# Patient Record
Sex: MALE | Race: WHITE | NOT HISPANIC OR LATINO | URBAN - METROPOLITAN AREA
[De-identification: names, ages, dates, MRNs, and addresses within clinical notes are randomized per-mention and may not be internally consistent; named-entity substitution may affect disease eponyms.]

---

## 2018-03-26 ENCOUNTER — EMERGENCY (EMERGENCY)
Facility: HOSPITAL | Age: 53
LOS: 1 days | Discharge: HOME | End: 2018-03-26
Attending: EMERGENCY MEDICINE | Admitting: EMERGENCY MEDICINE
Payer: COMMERCIAL

## 2018-03-26 VITALS
OXYGEN SATURATION: 99 % | TEMPERATURE: 99 F | RESPIRATION RATE: 16 BRPM | HEART RATE: 82 BPM | DIASTOLIC BLOOD PRESSURE: 76 MMHG | SYSTOLIC BLOOD PRESSURE: 125 MMHG

## 2018-03-26 DIAGNOSIS — Z79.891 LONG TERM (CURRENT) USE OF OPIATE ANALGESIC: ICD-10-CM

## 2018-03-26 DIAGNOSIS — Z79.2 LONG TERM (CURRENT) USE OF ANTIBIOTICS: ICD-10-CM

## 2018-03-26 DIAGNOSIS — M79.89 OTHER SPECIFIED SOFT TISSUE DISORDERS: ICD-10-CM

## 2018-03-26 DIAGNOSIS — M65.142 OTHER INFECTIVE (TENO)SYNOVITIS, LEFT HAND: ICD-10-CM

## 2018-03-26 PROCEDURE — 73140 X-RAY EXAM OF FINGER(S): CPT | Mod: 26,LT

## 2018-03-26 PROCEDURE — 99218: CPT | Mod: 25

## 2018-03-26 RX ORDER — OXYCODONE AND ACETAMINOPHEN 5; 325 MG/1; MG/1
1 TABLET ORAL ONCE
Qty: 0 | Refills: 0 | Status: DISCONTINUED | OUTPATIENT
Start: 2018-03-26 | End: 2018-03-26

## 2018-03-26 RX ORDER — HYDROMORPHONE HYDROCHLORIDE 2 MG/ML
0.5 INJECTION INTRAMUSCULAR; INTRAVENOUS; SUBCUTANEOUS ONCE
Qty: 0 | Refills: 0 | Status: DISCONTINUED | OUTPATIENT
Start: 2018-03-26 | End: 2018-03-26

## 2018-03-26 RX ORDER — HYDROMORPHONE HYDROCHLORIDE 2 MG/ML
1 INJECTION INTRAMUSCULAR; INTRAVENOUS; SUBCUTANEOUS ONCE
Qty: 0 | Refills: 0 | Status: DISCONTINUED | OUTPATIENT
Start: 2018-03-26 | End: 2018-03-26

## 2018-03-26 RX ORDER — MORPHINE SULFATE 50 MG/1
4 CAPSULE, EXTENDED RELEASE ORAL ONCE
Qty: 0 | Refills: 0 | Status: DISCONTINUED | OUTPATIENT
Start: 2018-03-26 | End: 2018-03-26

## 2018-03-26 RX ADMIN — HYDROMORPHONE HYDROCHLORIDE 0.5 MILLIGRAM(S): 2 INJECTION INTRAMUSCULAR; INTRAVENOUS; SUBCUTANEOUS at 20:56

## 2018-03-26 RX ADMIN — Medication 100 MILLIGRAM(S): at 15:32

## 2018-03-26 RX ADMIN — HYDROMORPHONE HYDROCHLORIDE 0.5 MILLIGRAM(S): 2 INJECTION INTRAMUSCULAR; INTRAVENOUS; SUBCUTANEOUS at 18:32

## 2018-03-26 RX ADMIN — Medication 100 MILLIGRAM(S): at 20:56

## 2018-03-26 RX ADMIN — HYDROMORPHONE HYDROCHLORIDE 1 MILLIGRAM(S): 2 INJECTION INTRAMUSCULAR; INTRAVENOUS; SUBCUTANEOUS at 23:41

## 2018-03-26 RX ADMIN — Medication 0.5 MILLIGRAM(S): at 16:16

## 2018-03-26 RX ADMIN — OXYCODONE AND ACETAMINOPHEN 1 TABLET(S): 5; 325 TABLET ORAL at 15:32

## 2018-03-26 RX ADMIN — MORPHINE SULFATE 4 MILLIGRAM(S): 50 CAPSULE, EXTENDED RELEASE ORAL at 17:08

## 2018-03-26 RX ADMIN — MORPHINE SULFATE 4 MILLIGRAM(S): 50 CAPSULE, EXTENDED RELEASE ORAL at 15:32

## 2018-03-26 RX ADMIN — HYDROMORPHONE HYDROCHLORIDE 0.5 MILLIGRAM(S): 2 INJECTION INTRAMUSCULAR; INTRAVENOUS; SUBCUTANEOUS at 21:11

## 2018-03-26 RX ADMIN — OXYCODONE AND ACETAMINOPHEN 1 TABLET(S): 5; 325 TABLET ORAL at 14:15

## 2018-03-26 NOTE — ED ADULT TRIAGE NOTE - CHIEF COMPLAINT QUOTE
pt presents with swelling, purple discoloration, and pain to the left hand after sustaining a bite to the middle finger while in [facility] several days ago.

## 2018-03-26 NOTE — ED PROVIDER NOTE - OBJECTIVE STATEMENT
53 yo male RHD here c/o left 3rd digit swelling with pain x 4 days. He states he was in [place] and he thinks he was bitten by an insect 4 days ago to his 3rd finger, a pimple developed to the area, and he popped it, he states the pain and swelling has become progressively worse. +subjective fever.

## 2018-03-26 NOTE — ED PROVIDER NOTE - DIAGNOSTIC INTERPRETATION
Interpreted by NONA ZIEGLER 3rd digit  No fracture, no dislocation (joint spaces grossly normal), no Foreign Body noted, soft tissue normal

## 2018-03-26 NOTE — ED ADULT NURSE NOTE - OBJECTIVE STATEMENT
here for insect bite to 3rd digit left hand x 4 days ago while in [facility]- pt arrives with redness, swelling, purple discoloration - c/o pain

## 2018-03-26 NOTE — ED PROVIDER NOTE - MEDICAL DECISION MAKING DETAILS
concern for flexor tenosynovitis, spoke with dr. carrington ugalde on call - he recommended clinda q6h IV, he will come in for debridement, will place on obs until the morning for repeat IV antibiotic dosing, patient agrees with plan

## 2018-03-26 NOTE — ED ADULT NURSE NOTE - CHIEF COMPLAINT QUOTE
pt presents with swelling, purple discoloration, and pain to the left hand after sustaining a bite to the middle finger while in [facility] several days ago.

## 2018-03-26 NOTE — ED CDU PROVIDER INITIAL DAY NOTE - PROGRESS NOTE DETAILS
Patient seen and examined by Dr. Segovia, hand on call, at bedside, I&d done with debridement by Dr. Segovia, Gram stain, aerobic cx, anaerobic cx, fungal cx, afb testing ordered via paper as requested by Dr. Segovia. Will keep overnight for IV antibiotics, next dose of Clindamycin at 9pm, then at 3am, then at 9am. As per Dr. Segovia, change dressing with silvadene in the morning and contact Dr. Segovia in the morning for dispo - admission vs discharged on rx clinda and silvadene dressings q12h. Will sign out to night team.

## 2018-03-26 NOTE — ED PROVIDER NOTE - MUSCULOSKELETAL, MLM
LUE: moderate swelling of left 3rd digit circumferential, held in flexed position, with oozing of straw colored clear fluid from inbetween PIP and DIP of extensor aspect, pan with passive extension, +mild ttp to flexor tendon, good cap refill, no sensory deficits, no streaking, rest of hand nontender, good radial pulse 2+, soft compartments

## 2018-03-26 NOTE — ED CDU PROVIDER INITIAL DAY NOTE - OBJECTIVE STATEMENT
51 yo male RHD here c/o left 3rd digit swelling with pain x 4 days. He states he was in [place] and he thinks he was bitten by an insect 4 days ago to his 3rd finger, a pimple developed to the area, and he popped it, he states the pain and swelling has become progressively worse. +subjective fever.

## 2018-03-26 NOTE — ED CDU PROVIDER INITIAL DAY NOTE - ATTENDING CONTRIBUTION TO CARE
51 yo. male with tenosynovitis finger after an insect bite got infected, seen and drained by dr shultz (hand) in ER, recc clinda IV Q6hrs, admit to obs, re-eval in AM and contact dr shultz, if worse, plan to admit to [facility] for continued IV abx, if improved can be dced home with PO abx.

## 2018-03-27 PROCEDURE — 99224: CPT

## 2018-03-27 RX ORDER — OXYCODONE AND ACETAMINOPHEN 5; 325 MG/1; MG/1
2 TABLET ORAL ONCE
Qty: 0 | Refills: 0 | Status: DISCONTINUED | OUTPATIENT
Start: 2018-03-27 | End: 2018-03-27

## 2018-03-27 RX ORDER — KETOROLAC TROMETHAMINE 30 MG/ML
30 SYRINGE (ML) INJECTION ONCE
Qty: 0 | Refills: 0 | Status: DISCONTINUED | OUTPATIENT
Start: 2018-03-27 | End: 2018-03-27

## 2018-03-27 RX ORDER — OXYCODONE AND ACETAMINOPHEN 5; 325 MG/1; MG/1
2 TABLET ORAL EVERY 4 HOURS
Qty: 0 | Refills: 0 | Status: DISCONTINUED | OUTPATIENT
Start: 2018-03-27 | End: 2018-03-28

## 2018-03-27 RX ORDER — PIPERACILLIN AND TAZOBACTAM 4; .5 G/20ML; G/20ML
3.38 INJECTION, POWDER, LYOPHILIZED, FOR SOLUTION INTRAVENOUS ONCE
Qty: 0 | Refills: 0 | Status: COMPLETED | OUTPATIENT
Start: 2018-03-27 | End: 2018-03-27

## 2018-03-27 RX ORDER — VANCOMYCIN HCL 1 G
1500 VIAL (EA) INTRAVENOUS EVERY 12 HOURS
Qty: 0 | Refills: 0 | Status: DISCONTINUED | OUTPATIENT
Start: 2018-03-27 | End: 2018-03-30

## 2018-03-27 RX ORDER — OXYCODONE AND ACETAMINOPHEN 5; 325 MG/1; MG/1
1 TABLET ORAL ONCE
Qty: 0 | Refills: 0 | Status: DISCONTINUED | OUTPATIENT
Start: 2018-03-27 | End: 2018-03-27

## 2018-03-27 RX ORDER — HYDROMORPHONE HYDROCHLORIDE 2 MG/ML
0.5 INJECTION INTRAMUSCULAR; INTRAVENOUS; SUBCUTANEOUS ONCE
Qty: 0 | Refills: 0 | Status: DISCONTINUED | OUTPATIENT
Start: 2018-03-27 | End: 2018-03-27

## 2018-03-27 RX ADMIN — OXYCODONE AND ACETAMINOPHEN 2 TABLET(S): 5; 325 TABLET ORAL at 16:23

## 2018-03-27 RX ADMIN — Medication 30 MILLIGRAM(S): at 22:45

## 2018-03-27 RX ADMIN — OXYCODONE AND ACETAMINOPHEN 2 TABLET(S): 5; 325 TABLET ORAL at 05:37

## 2018-03-27 RX ADMIN — HYDROMORPHONE HYDROCHLORIDE 1 MILLIGRAM(S): 2 INJECTION INTRAMUSCULAR; INTRAVENOUS; SUBCUTANEOUS at 04:09

## 2018-03-27 RX ADMIN — HYDROMORPHONE HYDROCHLORIDE 0.5 MILLIGRAM(S): 2 INJECTION INTRAMUSCULAR; INTRAVENOUS; SUBCUTANEOUS at 04:09

## 2018-03-27 RX ADMIN — OXYCODONE AND ACETAMINOPHEN 1 TABLET(S): 5; 325 TABLET ORAL at 10:59

## 2018-03-27 RX ADMIN — OXYCODONE AND ACETAMINOPHEN 2 TABLET(S): 5; 325 TABLET ORAL at 21:26

## 2018-03-27 RX ADMIN — OXYCODONE AND ACETAMINOPHEN 2 TABLET(S): 5; 325 TABLET ORAL at 20:46

## 2018-03-27 RX ADMIN — OXYCODONE AND ACETAMINOPHEN 2 TABLET(S): 5; 325 TABLET ORAL at 05:48

## 2018-03-27 RX ADMIN — Medication 30 MILLIGRAM(S): at 10:15

## 2018-03-27 RX ADMIN — Medication 30 MILLIGRAM(S): at 20:18

## 2018-03-27 RX ADMIN — OXYCODONE AND ACETAMINOPHEN 1 TABLET(S): 5; 325 TABLET ORAL at 20:18

## 2018-03-27 RX ADMIN — HYDROMORPHONE HYDROCHLORIDE 0.5 MILLIGRAM(S): 2 INJECTION INTRAMUSCULAR; INTRAVENOUS; SUBCUTANEOUS at 18:15

## 2018-03-27 RX ADMIN — Medication 100 MILLIGRAM(S): at 10:15

## 2018-03-27 RX ADMIN — HYDROMORPHONE HYDROCHLORIDE 0.5 MILLIGRAM(S): 2 INJECTION INTRAMUSCULAR; INTRAVENOUS; SUBCUTANEOUS at 03:58

## 2018-03-27 RX ADMIN — HYDROMORPHONE HYDROCHLORIDE 0.5 MILLIGRAM(S): 2 INJECTION INTRAMUSCULAR; INTRAVENOUS; SUBCUTANEOUS at 17:54

## 2018-03-27 RX ADMIN — Medication 100 MILLIGRAM(S): at 16:21

## 2018-03-27 RX ADMIN — Medication 30 MILLIGRAM(S): at 20:46

## 2018-03-27 RX ADMIN — OXYCODONE AND ACETAMINOPHEN 2 TABLET(S): 5; 325 TABLET ORAL at 18:15

## 2018-03-27 RX ADMIN — Medication 300 MILLIGRAM(S): at 22:38

## 2018-03-27 RX ADMIN — OXYCODONE AND ACETAMINOPHEN 1 TABLET(S): 5; 325 TABLET ORAL at 10:16

## 2018-03-27 RX ADMIN — Medication 100 MILLIGRAM(S): at 04:10

## 2018-03-27 RX ADMIN — PIPERACILLIN AND TAZOBACTAM 200 GRAM(S): 4; .5 INJECTION, POWDER, LYOPHILIZED, FOR SOLUTION INTRAVENOUS at 20:31

## 2018-03-27 NOTE — ED ADULT NURSE REASSESSMENT NOTE - COMFORT CARE
plan of care explained/wait time explained
po fluids offered/wait time explained/plan of care explained

## 2018-03-27 NOTE — ED CDU PROVIDER SUBSEQUENT DAY NOTE - SHIFT CHANGE DETAILS
follow up with [facility] hand dr shultz. follow up with [facility] hand dr shultz.    3/28/2018 8:01 am: signed out to dr hsieh, follow up pictures of hand at 10 am and contact dr shultz, and reassess for pain and clinical improvement.

## 2018-03-27 NOTE — ED CDU PROVIDER SUBSEQUENT DAY NOTE - HISTORY
patient with continued pain. ordered Dilaudid 0.5mg IV. receiving Clindamycin 600mg IV [id number] patient with continued pain. ordered Dilaudid 0.5mg IV. receiving Clindamycin 600mg IV [id number].    patient adamantly againist being admitted, discussed with patient multiple times. Patient seen and examined by Dr. Segovia, hand on call, at bedside, I&d done with debridement by Dr. Segovia, Gram stain, aerobic cx, anaerobic cx, fungal cx, afb testing ordered via paper as requested by Dr. Segovia. Will keep overnight for IV antibiotics, next dose of Clindamycin at 9pm, then at 3am, then at 9am. As per Dr. Segovia, change dressing with silvadene in the morning and contact Dr. Segovia in the morning for dispo - admission vs discharged on rx clinda and silvadene dressings q12h. Will sign out to night team.  patient with continued pain. ordered Dilaudid 0.5mg IV. receiving Clindamycin 600mg IV [id number].    patient adamantly againist being admitted, discussed with patient multiple times as feel this would be better managed inpatient.

## 2018-03-27 NOTE — ED CDU PROVIDER SUBSEQUENT DAY NOTE - PROGRESS NOTE DETAILS
wound examined. will attempt to speak with Luca on call plastics attempted to reach plastics on call, Luca from 5:45am via text/phone call/call to transfer center. called OR to see if working in OR. he was not on schedule. transfer center does not have another number. NONA Cardona endorsed patient at shift change pending call back from on call plastics, re-evaluation, and disposition Accepted sign out from Dr. Alejandro and NONA Cardona.  Pt needs Abx at 10am and also awaiting for call back from on call plastic surgeon that performed his debridement last night.  Awaiting final recs and dispo plan. 11AM - Case has been discussed with Dr. Segovia.  He evaluated pictures of pt's extremity and is very happy with outcome.  Pt also reports pain is improving and hand is looking better to him.  Dr. Segovia reports pt is stable for out pt treatment but wants him to get 24 hours of IV abx.  Will keep for additional dose of IV Clinda at 4pm and if remains stable will d/c.  Will place in volar splint and sling.  Discussed techniques to keep hand elevated throughout day and at night. Pt reports return of severe pain and mild worsening of swelling.  he is now uncomfortable going home 2/2 pain and concern for worsening.  Will keep for re-evaluation by Dr. Segovia. Dr. Segovia at bedside.  He reports pt does not need OR at this time but would like to transition him to Vancomycin and Zosyn.  re-dressed finger and placed in splint. patient resting comfortably. no IV pain medication given. percocet for pain. given Vanc 1gIV and Zosyn [id number]. will discharge in the morning after 2nd dose of Zosyn. pain controlled overnight. patient slept patient received 2nd dose of Zosyn. went in to discuss discharge with patient. discussed follow up plan. he was agreeable at this time. reports pain has much improved. he states he will take an uber home, this is not a problem. Called into room by nurse, during discharge patient states he is very mad that he "is being pushed out in the dark so abruptly." even though 30 min earlier patient agreed to leave. patient begins to yell, stating yesterday Dr. Segovia and two other people had a meeting with him and he was told he would get a full 24 hours dose of IV Vanc/Zosyn.  states this has not happened. explained this was not the sign out I received, but I would call Dr. Segovia.   Spoke with Dr. Segovia. Explained patient was to receive IV abx for 24 hours. He request pictures to be sent again after IV abx and a decision about disposition would be made.  explained to patient the plan. will ensure sign out occurs in front of patient.

## 2018-03-27 NOTE — ED CDU PROVIDER SUBSEQUENT DAY NOTE - NOTES
We spoke to dr shultz. per patient, he is supposed to receive a second dose of vanco in the morning. dr shultz confirmed to give vanco at 10 am, and retake pictures of hand for reevaluation. patient amenable to this plan. has not had fever, pain is controlled on po percocet. given miralax. will sign out to am team.

## 2018-03-28 VITALS
OXYGEN SATURATION: 96 % | RESPIRATION RATE: 17 BRPM | HEART RATE: 82 BPM | SYSTOLIC BLOOD PRESSURE: 113 MMHG | DIASTOLIC BLOOD PRESSURE: 61 MMHG | TEMPERATURE: 99 F

## 2018-03-28 PROCEDURE — 99217: CPT

## 2018-03-28 RX ORDER — IBUPROFEN 200 MG
1 TABLET ORAL
Qty: 28 | Refills: 0 | OUTPATIENT
Start: 2018-03-28 | End: 2018-04-03

## 2018-03-28 RX ORDER — PIPERACILLIN AND TAZOBACTAM 4; .5 G/20ML; G/20ML
3.38 INJECTION, POWDER, LYOPHILIZED, FOR SOLUTION INTRAVENOUS ONCE
Qty: 0 | Refills: 0 | Status: COMPLETED | OUTPATIENT
Start: 2018-03-28 | End: 2018-03-28

## 2018-03-28 RX ORDER — AZTREONAM 2 G
1 VIAL (EA) INJECTION
Qty: 20 | Refills: 0 | OUTPATIENT
Start: 2018-03-28 | End: 2018-04-06

## 2018-03-28 RX ORDER — POLYETHYLENE GLYCOL 3350 17 G/17G
17 POWDER, FOR SOLUTION ORAL ONCE
Qty: 0 | Refills: 0 | Status: COMPLETED | OUTPATIENT
Start: 2018-03-28 | End: 2018-03-28

## 2018-03-28 RX ADMIN — Medication 300 MILLIGRAM(S): at 10:13

## 2018-03-28 RX ADMIN — OXYCODONE AND ACETAMINOPHEN 2 TABLET(S): 5; 325 TABLET ORAL at 10:47

## 2018-03-28 RX ADMIN — PIPERACILLIN AND TAZOBACTAM 200 GRAM(S): 4; .5 INJECTION, POWDER, LYOPHILIZED, FOR SOLUTION INTRAVENOUS at 04:15

## 2018-03-28 RX ADMIN — POLYETHYLENE GLYCOL 3350 17 GRAM(S): 17 POWDER, FOR SOLUTION ORAL at 10:14

## 2018-03-28 RX ADMIN — OXYCODONE AND ACETAMINOPHEN 2 TABLET(S): 5; 325 TABLET ORAL at 00:27

## 2018-03-28 RX ADMIN — OXYCODONE AND ACETAMINOPHEN 2 TABLET(S): 5; 325 TABLET ORAL at 05:58

## 2018-03-28 RX ADMIN — PIPERACILLIN AND TAZOBACTAM 200 GRAM(S): 4; .5 INJECTION, POWDER, LYOPHILIZED, FOR SOLUTION INTRAVENOUS at 13:29

## 2018-03-28 RX ADMIN — OXYCODONE AND ACETAMINOPHEN 2 TABLET(S): 5; 325 TABLET ORAL at 00:58

## 2018-03-28 RX ADMIN — OXYCODONE AND ACETAMINOPHEN 2 TABLET(S): 5; 325 TABLET ORAL at 10:13

## 2018-03-28 RX ADMIN — OXYCODONE AND ACETAMINOPHEN 2 TABLET(S): 5; 325 TABLET ORAL at 06:20

## 2018-03-28 NOTE — ED CDU PROVIDER DISPOSITION NOTE - CLINICAL COURSE
left 3rd digit flexor tenosynovitis, debrided at bedside by dr. shultz covering hand, IV antibiotics given over the last few days on obs, improved, will dc home with bactrim, augmentin, silvadene pain meds, f/u in 2 days with dr. shultz. return precautions discussed

## 2018-03-28 NOTE — ED CDU PROVIDER SUBSEQUENT DAY NOTE - DIAGNOSTIC INTERPRETATION
Interpreted by NONA ZIEGLER 3rd digit  No fracture, no dislocation (joint spaces grossly normal), no Foreign Body noted, soft tissue normal
Interpreted by NONA ZIEGLER 3rd digit  No fracture, no dislocation (joint spaces grossly normal), no Foreign Body noted, soft tissue normal

## 2018-03-28 NOTE — ED CDU PROVIDER SUBSEQUENT DAY NOTE - SKIN, MLM
Skin normal color for race, warm, dry and intact. No evidence of rash.

## 2018-03-28 NOTE — ED CDU PROVIDER SUBSEQUENT DAY NOTE - NEUROLOGICAL, MLM
Alert and oriented, no focal deficits, no motor or sensory deficits.

## 2018-03-28 NOTE — ED CDU PROVIDER SUBSEQUENT DAY NOTE - HISTORY
Patient seen and examined by Dr. Segovia, hand on call, at bedside, I&d done with debridement by Dr. Segovia, Gram stain, aerobic cx, anaerobic cx, fungal cx, afb testing ordered via paper as requested by Dr. Segovia. Will keep overnight for IV antibiotics, next dose of Clindamycin at 9pm, then at 3am, then at 9am. As per Dr. Segovia, change dressing with silvadene in the morning and contact Dr. Segovia in the morning for dispo - admission vs discharged on rx clinda and silvadene dressings q12h. Will sign out to night team.  patient with continued pain. ordered Dilaudid 0.5mg IV. receiving Clindamycin 600mg IV [id number].    patient adamantly againist being admitted, discussed with patient multiple times as feel this would be better managed inpatient.

## 2018-03-28 NOTE — ED CDU PROVIDER SUBSEQUENT DAY NOTE - FAMILY HISTORY
No pertinent family history in first degree relatives

## 2018-03-28 NOTE — ED CDU PROVIDER SUBSEQUENT DAY NOTE - PROGRESS NOTE DETAILS
Received signout from overnight team, dose of IV Vancomycin at 10:30am and Zosyn at 1pm and to discuss care with Dr. Segovia. Evaluated patient's hand - dressing removed, +granulation tissue, no pus, no streaking, appears to be less erythematous/swollen than previously. Discussed care with Dr. Segovia - will give dose of zosyn/vanco this morning/afternoon, dc home with bactrim, augmentin, silvadene twice daily dressing changes (patient instructed on dressing changes), f/u friday in 2 days, return precautions discussed

## 2018-03-28 NOTE — ED ADULT NURSE REASSESSMENT NOTE - NS ED NURSE REASSESS COMMENT FT1
Patient medicated for pain, appears comfortable. remains A&Ox3, breathing unlabored, in no acute distress.  Pending further medical evaluation will continue to monitor. Food ordered from diner.
Pt resting calmly. No distress noted. Awaiting antibiotics as per orders.
patient now unhappy with plan to discharge. states " someone told me I would be getting 2nd dose of antibiotics" pt. has previously spoken to [facility] Cardona about being discharged after 2nd dose of Zosyn, pt. now wants 2nd dose of vancomycin. CAS[facility] Cardona made aware, will speak to patient.
patient screaming at NONA Cardona when Brendan was  trying to explain antibiotic treatment plan.
patient reassessed by PA and RN. pt. did not ask for pain medication, approves of discharge plan. receiving last dose of antibiotics.
received pt. from [facility], pt. resting in hospital stretcher. receiving IV antibiotics.

## 2018-03-28 NOTE — ED CDU PROVIDER DISPOSITION NOTE - CLINICAL COURSE
53 yo. male with tenosynovitis finger after an insect bite got infected, seen and drained by dr segovia (hand) in ER, recc clinda IV Q6hrs. Dr. Segovia returned day 2 reports improving. At this point team tried to discharge, but patient states feels worse. Dr. Segovia reccs IV Vanc/Zosyn until morning. volar splint placed on hand. patient to follow up with carrington. will get clindamycin and percocet. at time of discharge, patient feels much improved and is ready to go

## 2018-03-28 NOTE — ED CDU PROVIDER SUBSEQUENT DAY NOTE - ATTENDING CONTRIBUTION TO CARE
51 yo. male with tenosynovitis finger after an insect bite got infected, seen and drained by dr shultz (hand) in ER, recc clinda IV Q6hrs, admit to obs, re-eval in AM and contact dr shultz, if worse, plan to admit to [facility] for continued IV abx, if improved can be dced home with PO abx.
additional doses of IV abx to be given in ED today, likely sdc later today
see progress notes for details

## 2018-03-28 NOTE — ED CDU PROVIDER SUBSEQUENT DAY NOTE - RESPIRATORY, MLM
Breath sounds clear and equal bilaterally.

## 2018-03-28 NOTE — ED CDU PROVIDER SUBSEQUENT DAY NOTE - EYES, MLM
Clear bilaterally, pupils equal, round and reactive to light.

## 2018-03-28 NOTE — ED CDU PROVIDER SUBSEQUENT DAY NOTE - MEDICAL DECISION MAKING DETAILS
see previous notes
concern for flexor tenosynovitis, spoke with dr. carrington ugalde on call - he recommended clinda q6h IV, he will come in for debridement, will place on obs until the morning for repeat IV antibiotic dosing, patient agrees with plan
see previous notes

## 2018-03-28 NOTE — ED CDU PROVIDER SUBSEQUENT DAY NOTE - CARDIAC, MLM
Normal rate, regular rhythm.  Heart sounds S1, S2.  No murmurs, rubs or gallops.

## 2018-03-28 NOTE — ED CDU PROVIDER SUBSEQUENT DAY NOTE - PROGRESS NOTE DETAILS
wound examined. will attempt to speak with Luca on call plastics attempted to reach plastics on call, Luca from 5:45am via text/phone call/call to transfer center. called OR to see if working in OR. he was not on schedule. transfer center does not have another number. NONA Cardona endorsed patient at shift change pending call back from on call plastics, re-evaluation, and disposition Accepted sign out from Dr. Alejandro and NONA Cardona.  Pt needs Abx at 10am and also awaiting for call back from on call plastic surgeon that performed his debridement last night.  Awaiting final recs and dispo plan. 11AM - Case has been discussed with Dr. Segovia.  He evaluated pictures of pt's extremity and is very happy with outcome.  Pt also reports pain is improving and hand is looking better to him.  Dr. Segovia reports pt is stable for out pt treatment but wants him to get 24 hours of IV abx.  Will keep for additional dose of IV Clinda at 4pm and if remains stable will d/c.  Will place in volar splint and sling.  Discussed techniques to keep hand elevated throughout day and at night. Pt reports return of severe pain and mild worsening of swelling.  he is now uncomfortable going home 2/2 pain and concern for worsening.  Will keep for re-evaluation by Dr. Segovia. Dr. Segovia at bedside.  He reports pt does not need OR at this time but would like to transition him to Vancomycin and Zosyn.  re-dressed finger and placed in splint. patient resting comfortably. no IV pain medication given. percocet for pain. given Vanc 1gIV and Zosyn [id number]. will discharge in the morning after 2nd dose of Zosyn. pain controlled overnight. patient slept patient received 2nd dose of Zosyn. went in to discuss discharge with patient. discussed follow up plan. he was agreeable at this time. reports pain has much improved. he states he will take an uber home, this is not a problem. Called into room by nurse, during discharge patient states he is very mad that he "is being pushed out in the dark so abruptly." even though 30 min earlier patient agreed to leave. patient begins to yell, stating yesterday Dr. Segovia and two other people had a meeting with him and he was told he would get a full 24 hours dose of IV Vanc/Zosyn.  states this has not happened. explained this was not the sign out I received, but I would call Dr. Segovia.   Spoke with Dr. Segovia. Explained patient was to receive IV abx for 24 hours. He request pictures to be sent again after IV abx and a decision about disposition would be made.  explained to patient the plan. will ensure sign out occurs in front of patient.

## 2018-03-28 NOTE — ED CDU PROVIDER DISPOSITION NOTE - ATTENDING CONTRIBUTION TO CARE
sx improving and hand with dec swelling, appears responsive to abx, stable for dc home outpatient hand sx followup, dr shultz agrees with plan and will see the patient in the office for followup.

## 2018-03-28 NOTE — ED CDU PROVIDER SUBSEQUENT DAY NOTE - MUSCULOSKELETAL, MLM
Spine appears normal, range of motion is not limited, no muscle or joint tenderness

## 2018-05-05 ENCOUNTER — EMERGENCY (EMERGENCY)
Facility: HOSPITAL | Age: 53
LOS: 1 days | Discharge: HOME | End: 2018-05-05
Attending: EMERGENCY MEDICINE | Admitting: EMERGENCY MEDICINE
Payer: COMMERCIAL

## 2018-05-05 VITALS
HEART RATE: 102 BPM | RESPIRATION RATE: 17 BRPM | HEIGHT: 70 IN | DIASTOLIC BLOOD PRESSURE: 82 MMHG | OXYGEN SATURATION: 97 % | WEIGHT: 190.04 LBS | TEMPERATURE: 98 F | SYSTOLIC BLOOD PRESSURE: 132 MMHG

## 2018-05-05 DIAGNOSIS — Z79.2 LONG TERM (CURRENT) USE OF ANTIBIOTICS: ICD-10-CM

## 2018-05-05 DIAGNOSIS — Z79.1 LONG TERM (CURRENT) USE OF NON-STEROIDAL ANTI-INFLAMMATORIES (NSAID): ICD-10-CM

## 2018-05-05 DIAGNOSIS — Z79.891 LONG TERM (CURRENT) USE OF OPIATE ANALGESIC: ICD-10-CM

## 2018-05-05 DIAGNOSIS — K61.0 ANAL ABSCESS: ICD-10-CM

## 2018-05-05 DIAGNOSIS — Z79.899 OTHER LONG TERM (CURRENT) DRUG THERAPY: ICD-10-CM

## 2018-05-05 PROCEDURE — 99283 EMERGENCY DEPT VISIT LOW MDM: CPT

## 2018-05-05 RX ORDER — AZTREONAM 2 G
1 VIAL (EA) INJECTION
Qty: 20 | Refills: 0 | OUTPATIENT
Start: 2018-05-05 | End: 2018-05-14

## 2018-05-05 RX ADMIN — Medication 600 MILLIGRAM(S): at 18:02

## 2018-05-05 RX ADMIN — Medication 1 TABLET(S): at 18:02

## 2018-05-05 RX ADMIN — Medication 500 MILLIGRAM(S): at 18:02

## 2018-05-05 NOTE — ED PROVIDER NOTE - GASTROINTESTINAL, MLM
Abdomen soft, non-tender, no guarding. Rectal exam: 11 o clock position slight area of erythema w/ induration, no fluctuance, no external hemorrhoid or thrombosis

## 2018-05-05 NOTE — ED PROVIDER NOTE - OBJECTIVE STATEMENT
51 y/o M w/ history of recurrent perirectal abscess and fissures, followed by colorectal surgery, no known h/o MRSA(not tested for), who presents w/ area of pain and swelling to 11 o clock position of his rectum x1 week, gradual onset and progressively worsening. Feels like an abscess. Denies fever, chills, abdominal pain, nausea, vomiting, difficulty passing stool, pain w/ bowel movements.

## 2020-10-09 NOTE — ED CDU PROVIDER INITIAL DAY NOTE - NEUROLOGICAL, MLM
PROVIDER:[TOKEN:[[id number]]]
Alert and oriented, no focal deficits, no motor or sensory deficits.

## 2021-07-02 NOTE — ED CDU PROVIDER DISPOSITION NOTE - NS_EDPROVIDERDISPOUSERTYPE_ED_A_ED
Completed fed med form. A few blanks need to be filled.   Print recent A1c and attach.   Form is on provider's desk.      Attending Attestation (For Attendings USE Only)...